# Patient Record
Sex: MALE | Race: BLACK OR AFRICAN AMERICAN
[De-identification: names, ages, dates, MRNs, and addresses within clinical notes are randomized per-mention and may not be internally consistent; named-entity substitution may affect disease eponyms.]

---

## 2018-07-19 NOTE — EMERGENCY ROOM REPORT
History of Present Illness


General


Chief Complaint:  Abdominal Pain


Source:  Patient





Present Illness


HPI


33-year-old male presents to the emergency department complaining of 2 out of 

10 in severity intermittent cramping lower abdominal pain with associated 

diarrhea 3 days.  Patient reports he just returned from [place] and had acute 

onset of his symptoms when he landed.  Patient denies fevers or chills he 

denies blood in the stool or black tarry stools.  Patient denies nausea or 

vomiting.  He states he is up-to-date with his vaccinations denies ill contacts 

with similar symptoms.  Patient denies abdominal tenderness.  Patient estimates 

that since landing 3 days ago he's had approximately 20 loose stools that he 

describes as primarily watery in consistency.  Denies fatigue, rashes, swollen 

tender lymph nodes, headache or dizziness.


Allergies:  


Coded Allergies:  


     No Known Allergies (Unverified , 2/28/14)





Patient History


Past Medical History:  see triage record


Past Surgical History:  none


Pertinent Family History:  none


Reviewed Nursing Documentation:  PMH: Agreed; PSxH: Agreed





Nursing Documentation-PMH


Past Medical History:  No Stated History





Review of Systems


All Other Systems:  negative except mentioned in HPI





Physical Exam





Vital Signs








  Date Time  Temp Pulse Resp B/P (MAP) Pulse Ox O2 Delivery O2 Flow Rate FiO2


 


7/19/18 12:15 98.4 86 20 133/80 97 Room Air  





 98.4       








Sp02 EP Interpretation:  reviewed, normal


General Appearance:  no apparent distress, alert, GCS 15, non-toxic


Head:  normocephalic, atraumatic


ENT:  hearing grossly normal, normal voice


Neck:  full range of motion


Respiratory:  chest non-tender, lungs clear, normal breath sounds, speaking 

full sentences


Cardiovascular #1:  regular rate, rhythm, no edema


Gastrointestinal:  normal bowel sounds - hyperactive BS in all 4 quadrants., 

non tender, soft, no peritonitis, non-distended, no guarding


Rectal:  deferred


Genitourinary:  normal inspection


Musculoskeletal:  back normal, gait/station normal, normal range of motion, non-

tender


Neurologic:  alert, oriented x3, responsive, motor strength/tone normal, 

sensory intact, speech normal, grossly normal


Psychiatric:  judgement/insight normal


Skin:  normal color, no rash, warm/dry, well hydrated


Lymphatic:  no adenopathy





Medical Decision Making


PA Attestation


Dr. Vasquez is my supervising Physician whom patient management has been 

discussed with.


Diagnostic Impression:  


 Primary Impression:  


 Diarrhea


 Qualified Codes:  R19.7 - Diarrhea, unspecified


ER Course


33-year-old male presents to the emergency department complaining of 2 out of 

10 in severity intermittent cramping lower abdominal pain with associated 

diarrhea 3 days.  Patient reports he just returned from [place] and had acute 

onset of his symptoms when he landed.  Patient denies fevers or chills he 

denies blood in the stool or black tarry stools.  Patient denies nausea or 

vomiting.  He states he is up-to-date with his vaccinations denies ill contacts 

with similar symptoms.  Patient denies abdominal tenderness.  Patient estimates 

that since landing 3 days ago he's had approximately 20 loose stools that he 

describes as primarily watery in consistency.  Denies fatigue, rashes, swollen 

tender lymph nodes, headache or dizziness. 





Ddx considered but are not limited to GE, colitis, acute appy, SBO, Cyclical 

Vomiting secondary to THC





Vital signs:  pt. is afebrile,


   


H&PE are most consistent with  GE most likely traveler's diarrhea, no evidence 

to suggest acute abdomen on physical exam. pt. non-toxic in appearance and for 

the most part NAD





ORDERS: 





-None required at this time, the dx is clinical. 








ED INTERVENTIONS: 


-1000 NS IV hydration,


-Bentyl  PO


- Mylanta PO





d/w pt. conservative treatment, and to follow up with a primary care provider. 

pt given a list of primary care clinics for follow up. d/w pt. to return to the 

ED with worsening or new symptoms.





DISCHARGE: At this time pt. is stable for d/c to home. Will provide printed 

patient care instructions, and any necessary prescriptions. Care plan and 

follow up instructions have been discussed with the patient prior to discharge.





Last Vital Signs








  Date Time  Temp Pulse Resp B/P (MAP) Pulse Ox O2 Delivery O2 Flow Rate FiO2


 


7/19/18 12:15 98.4 86 20 133/80 97 Room Air  





 98.4       








Disposition:  HOME, SELF-CARE


Condition:  Stable


Scripts


Simethicone* (SIMETHICONE*) 80 Mg Tab.chew


80 MG ORAL Q8H, #20 TAB 0 Refills


   Prov: Carmina Hough         7/19/18 


Dicyclomine Hcl* (DICYCLOMINE HCL*) 10 Mg Capsule


10 MG PO QID, #16 CAP


   Prov: Carmina Hough         7/19/18


Departure Forms:  Return to Work         Work Restrictions:  None


   Other Restrictions:  please excuse for 7/17/18 as well. 


   Return to Full Activity:  Jul 23, 2018


Patient Instructions:  Diarrhea, Adult, Easy-to-Read, Food Choices to Help 

Relieve Diarrhea, Adult





Additional Instructions:  


Take medications as directed. 


 ** Follow up with a Primary Care Provider in 3-5 days, even if your symptoms 

have resolved. ** 


--Please review list of primary care clinics, if you do not already have a 

primary care provider





Return sooner to ED if new symptoms occur, or current symptoms become worse. 





- Please note that this Emergency Department Report was dictated using [employer] technology software, occasionally this can lead to 

erroneous entry secondary to interpretation by the dictation equipment.











Carmina Hough Jul 19, 2018 12:38

## 2019-03-11 ENCOUNTER — HOSPITAL ENCOUNTER (EMERGENCY)
Dept: HOSPITAL 72 - EMR | Age: 34
Discharge: HOME | End: 2019-03-11
Payer: SELF-PAY

## 2019-03-11 VITALS — BODY MASS INDEX: 26.6 KG/M2 | HEIGHT: 71 IN | WEIGHT: 190 LBS

## 2019-03-11 VITALS — DIASTOLIC BLOOD PRESSURE: 85 MMHG | SYSTOLIC BLOOD PRESSURE: 136 MMHG

## 2019-03-11 DIAGNOSIS — J02.9: Primary | ICD-10-CM

## 2019-03-11 PROCEDURE — 99282 EMERGENCY DEPT VISIT SF MDM: CPT

## 2019-03-11 NOTE — EMERGENCY ROOM REPORT
History of Present Illness


General


Chief Complaint:  Sore Throat


Source:  Patient





Present Illness


HPI


Patient presents with 4 days of throat pain.  He states the pain is a 7/10.  

Burning when he swallows.  The last time this happened he got treated with 

antibiotics and improved.  He is requesting antibiotics at this time.  He did 

not receive a flu vaccination.





No fevers, chills, chest pain, palpitations, nausea, vomiting, diarrhea, dysuria

, abdominal pain, shortness of breath, depression, visual changes, headache.


Allergies:  


Coded Allergies:  


     No Known Allergies (Unverified , 2/28/14)





Patient History


Past Medical History:  see triage record


Social History:  Denies: smoking


Social History Narrative


maintenance at [employer]


Reviewed Nursing Documentation:  PMH: Agreed; PSxH: Agreed





Review of Systems


Cardiovascular:  Denies: no symptoms, see HPI, chest pain, edema, palpitations, 

syncope, PND, other


All Other Systems:  negative except mentioned in HPI





Physical Exam





Vital Signs








  Date Time  Temp Pulse Resp B/P (MAP) Pulse Ox O2 Delivery O2 Flow Rate FiO2


 


3/11/19 09:00 98.2 67 19 136/85 99 Room Air  








Sp02 EP Interpretation:  reviewed, normal


General Appearance:  well appearing, no apparent distress


Head:  normocephalic, atraumatic


Eyes:  bilateral eye normal inspection, bilateral eye PERRL


ENT:  hearing grossly normal, no angioedema, normal voice, pharyngeal erythema 

- No exudate


Neck:  full range of motion, supple, thyroid normal


Respiratory:  lungs clear, normal breath sounds, no respiratory distress, 

speaking full sentences


Cardiovascular #1:  regular rate, rhythm


Cardiovascular #2:  2+ radial (R)


Gastrointestinal:  normal inspection


Musculoskeletal:  back normal, digits/nails normal, gait/station normal


Neurologic:  alert, oriented x3, normal gait, grossly normal


Psychiatric:  mood/affect normal


Skin:  no rash


Lymphatic:  adenopathy - Anterior neck





Medical Decision Making


Diagnostic Impression:  


 Primary Impression:  


 Pharyngitis


 Qualified Codes:  J02.9 - Acute pharyngitis, unspecified


ER Course


Patient presents with sore throat.  Different differential includes 

Streptococcus, viral, mononucleosis amongst others.  Exam is against influenza.

  Based on exam antibiotics are indicated.  Tylenol and azithromycin are given.





Treatment plan discussed with patient.





Patient stable for outpatient observation and treatment.





Last Vital Signs








  Date Time  Temp Pulse Resp B/P (MAP) Pulse Ox O2 Delivery O2 Flow Rate FiO2


 


3/11/19 09:28 98.2       


 


3/11/19 09:22  68 19 136/85 99 Room Air  








Status:  improved


Disposition:  HOME, SELF-CARE


Condition:  Stable


Scripts


Azithromycin* (ZITHROMAX*) 250 Mg Tablet


250 MG ORAL DAILY, #4 TAB


   Prov: Anthony Vasquez MD         3/11/19











Anthony Vasquez MD Mar 11, 2019 09:15

## 2020-12-15 ENCOUNTER — HOSPITAL ENCOUNTER (EMERGENCY)
Dept: HOSPITAL 72 - EMR | Age: 35
Discharge: HOME | End: 2020-12-15
Payer: MEDICAID

## 2020-12-15 VITALS — DIASTOLIC BLOOD PRESSURE: 76 MMHG | SYSTOLIC BLOOD PRESSURE: 132 MMHG

## 2020-12-15 VITALS — HEIGHT: 71 IN | BODY MASS INDEX: 32.2 KG/M2 | WEIGHT: 230 LBS

## 2020-12-15 VITALS — SYSTOLIC BLOOD PRESSURE: 132 MMHG | DIASTOLIC BLOOD PRESSURE: 76 MMHG

## 2020-12-15 DIAGNOSIS — K27.9: Primary | ICD-10-CM

## 2020-12-15 PROCEDURE — 99282 EMERGENCY DEPT VISIT SF MDM: CPT

## 2020-12-15 PROCEDURE — 93005 ELECTROCARDIOGRAM TRACING: CPT

## 2020-12-15 NOTE — EMERGENCY ROOM REPORT
History of Present Illness


General


Chief Complaint:  Abdominal Pain


Source:  Patient, Medical Record





Present Illness


HPI


35-year-old male with no past medical history.  He presents with chief complaint

of epigastric/chest pain.  On and off for a week now.  Usually occur after 

eating fatty and fried food.  Pain started in the epigastric area and goes up 

through his chest and throat area.  Burning sensation.  No nausea no vomiting.  

No diarrhea.  No fever chills.  Denies any exertional component.  No 

diaphoresis.  Pain is a burning gnawing sensation.  Rated as 6 out of 10.


Allergies:  


Coded Allergies:  


     No Known Allergies (Unverified , 2/28/14)





COVID-19 Screening


Contact w/high risk pt:  No


Experienced COVID-19 symptoms?:  No


COVID-19 Testing performed PTA:  No





Patient History


Past Medical History:  see triage record, old chart reviewed


Past Surgical History:  none


Pertinent Family History:  none


Social History:  Denies: smoking


Immunizations:  other


Reviewed Nursing Documentation:  PMH: Agreed; PSxH: Agreed





Nursing Documentation-PMH


Past Medical History:  No Stated History





Review of Systems


Eye:  Denies: eye pain, blurred vision


ENT:  Denies: ear pain, nose congestion, throat swelling


Respiratory:  Denies: cough, shortness of breath


Cardiovascular:  Reports: chest pain; Denies: palpitations


Gastrointestinal:  Reports: abdominal pain; Denies: diarrhea, nausea, vomiting


Musculoskeletal:  Denies: back pain, joint pain


Skin:  Denies: rash


Neurological:  Denies: headache, numbness


Endocrine:  Denies: increased thirst, increased urine


Hematologic/Lymphatic:  Denies: easy bruising


All Other Systems:  negative except mentioned in HPI





Physical Exam





Vital Signs








  Date Time  Temp Pulse Resp B/P (MAP) Pulse Ox O2 Delivery O2 Flow Rate FiO2


 


12/15/20 05:08 98.6 76 16 132/76 (94) 98 Room Air  





Vitals normal


Sp02 EP Interpretation:  reviewed, normal


General Appearance:  well appearing, no apparent distress, alert


Head:  normocephalic, atraumatic


Eyes:  bilateral eye PERRL, bilateral eye EOMI


ENT:  hearing grossly normal, normal pharynx


Neck:  full range of motion, supple, no meningismus


Respiratory:  chest non-tender, lungs clear, normal breath sounds


Cardiovascular #1:  regular rate, rhythm, no murmur


Gastrointestinal:  normal bowel sounds, non tender, no mass, no organomegaly, no

bruit, non-distended


Musculoskeletal:  back normal, normal range of motion, gait/station normal


Psychiatric:  mood/affect normal





Medical Decision Making


Diagnostic Impression:  


   Primary Impression:  


   PUD (peptic ulcer disease)


ER Course


Patient with epigastric pain.  EKG is normal.  I see no evidence of ACS, PE, 

dissection.  His symptoms consistent with peptic ulcer disease/gastritis.  Will 

discharge home.


EKG Diagnostic Results


Troponin ordered:  No


Rate:  normal


Rhythm:  NSR


ST Segments:  no acute changes





Last Vital Signs








  Date Time  Temp Pulse Resp B/P (MAP) Pulse Ox O2 Delivery O2 Flow Rate FiO2


 


12/15/20 05:08 98.6 76 16 132/76 (94) 98 Room Air  








Status:  improved


Disposition:  HOME, SELF-CARE


Condition:  Stable


Scripts


Omeprazole Magnesium (PRILOSEC OTC) 20 Mg Tablet.


20 MG ORAL DAILY, #30 TAB


   Prov: Gaurang Mendoza MD         12/15/20





Additional Instructions:  


Follow with your doctor in 7 days.  Return if symptoms worsen.











Gaurang Mendoza MD                  Dec 15, 2020 05:25

## 2020-12-15 NOTE — NUR
ER DISCHARGE NOTE:



Patient is cleared to be discharged per ERMD, pt is aox4, on room air, with 
stable vital signs. pt was given dc and prescription instructions, pt was able 
to verbalize understanding, pt id band removed.  pt is able to ambulate with 
steady gait. pt took all belongings.

## 2020-12-15 NOTE — NUR
ED Nurse Note:



Patient walked into the ED with c/o mid epigastric pain /chest pain onset 1 
week ago. Pain 6/10 and pain is on and off and usually occurs after eating 
fatty foods. Pt stated he changed his diet recently. Patient denies SOB/, 
N/V/D, fever and chills. Patient denies injury/trauma. Patient is AAOx4 and 
ambulatory.